# Patient Record
Sex: FEMALE | Race: WHITE | HISPANIC OR LATINO | Employment: UNEMPLOYED | URBAN - METROPOLITAN AREA
[De-identification: names, ages, dates, MRNs, and addresses within clinical notes are randomized per-mention and may not be internally consistent; named-entity substitution may affect disease eponyms.]

---

## 2023-12-12 ENCOUNTER — APPOINTMENT (OUTPATIENT)
Dept: RADIOLOGY | Facility: MEDICAL CENTER | Age: 23
End: 2023-12-12
Attending: OBSTETRICS & GYNECOLOGY
Payer: COMMERCIAL

## 2023-12-12 ENCOUNTER — HOSPITAL ENCOUNTER (EMERGENCY)
Facility: MEDICAL CENTER | Age: 23
End: 2023-12-12
Attending: OBSTETRICS & GYNECOLOGY | Admitting: OBSTETRICS & GYNECOLOGY
Payer: COMMERCIAL

## 2023-12-12 VITALS
RESPIRATION RATE: 17 BRPM | HEIGHT: 65 IN | SYSTOLIC BLOOD PRESSURE: 127 MMHG | DIASTOLIC BLOOD PRESSURE: 76 MMHG | OXYGEN SATURATION: 98 % | WEIGHT: 158.73 LBS | BODY MASS INDEX: 26.45 KG/M2 | TEMPERATURE: 97.8 F | HEART RATE: 106 BPM

## 2023-12-12 PROCEDURE — 76819 FETAL BIOPHYS PROFIL W/O NST: CPT

## 2023-12-12 PROCEDURE — 59025 FETAL NON-STRESS TEST: CPT

## 2023-12-12 PROCEDURE — 59025 FETAL NON-STRESS TEST: CPT | Mod: 26 | Performed by: OBSTETRICS & GYNECOLOGY

## 2023-12-12 PROCEDURE — 700102 HCHG RX REV CODE 250 W/ 637 OVERRIDE(OP): Performed by: OBSTETRICS & GYNECOLOGY

## 2023-12-12 PROCEDURE — A9270 NON-COVERED ITEM OR SERVICE: HCPCS | Performed by: OBSTETRICS & GYNECOLOGY

## 2023-12-12 PROCEDURE — 99284 EMERGENCY DEPT VISIT MOD MDM: CPT

## 2023-12-12 PROCEDURE — 99283 EMERGENCY DEPT VISIT LOW MDM: CPT | Mod: 25 | Performed by: OBSTETRICS & GYNECOLOGY

## 2023-12-12 RX ORDER — ACETAMINOPHEN 500 MG
1000 TABLET ORAL ONCE
Status: COMPLETED | OUTPATIENT
Start: 2023-12-12 | End: 2023-12-12

## 2023-12-12 RX ADMIN — ACETAMINOPHEN 1000 MG: 500 TABLET, FILM COATED ORAL at 18:44

## 2023-12-12 ASSESSMENT — PAIN DESCRIPTION - PAIN TYPE
TYPE: ACUTE PAIN

## 2023-12-12 ASSESSMENT — PAIN SCALES - GENERAL: PAINLEVEL: 5 - MODERATE PAIN

## 2023-12-13 NOTE — PROGRESS NOTES
22yo, , edc02/13, pt presents with c/o abdominal pain that radiates to her back x1 week after an almost fall last . Pt states she has tried tylenol for the pain but has only temporary relief. Pt went to doctors office in [place] today and was advised to come to the hospital to be seen. Pt denies LOF, vag bleeding. POS fm. EFM and [place] placed. VSS.    Discharge instructions given. Patient states understanding. Patient discharged in stable condition, ambulatory, with FOB.

## 2023-12-13 NOTE — ED PROVIDER NOTES
"S: Pt is a 23 y.o.  at 31w0d with Estimated Date of Delivery: 24 who presents to triage c/o an almost fall approximately one week ago that set off pack pain that wraps around her right side. She was told by her doctors in [place] to come to labor and delivery for assessment.    Denies VB, RUCs, LOF.  Reports good FM.  No pain with urination.     O: /76   Pulse (!) 106   Temp 36.6 °C (97.8 °F) (Temporal)   Resp 17   Ht 1.65 m (5' 4.96\")   Wt 72 kg (158 lb 11.7 oz)   SpO2 98%   BMI 26.45 kg/m²          NST: Done and read by me         Indication: abdominal pain in pregnancy       FHR: 135       Variability: moderate       Acclerations: present       Decelerations: none       Reactive: yes         [place]: no UCs       SVE: deferred    Gen: A&Ox3 NAD  CV: no edema or cyanosis  Resp: normal effort  Abd: soft, NT, gravid. Paraspinal muscles ropey in texture and tender on the right.  Ext: no edema or TTP  Psych: normal mood and affect          A/P  There are no problems to display for this patient.      1.  IUP @ 31w0d  2.  reactive NST.  3.  BPP   4.  Suspect musculoskeletal strain set off by episode of imbalance. Advised tylenol and heat back to spine. Return if pain worsens or if patient develops any fevers/chills/ vaginal bleeding  5.  F/u in the office in as routinely scheduled.      Evaluation and clinical decision making , including analysis of Fetal data and maternal lab work completed over a 1 hour period.       Brittney Fisher DO        "